# Patient Record
Sex: FEMALE | Race: WHITE | NOT HISPANIC OR LATINO | Employment: FULL TIME | URBAN - METROPOLITAN AREA
[De-identification: names, ages, dates, MRNs, and addresses within clinical notes are randomized per-mention and may not be internally consistent; named-entity substitution may affect disease eponyms.]

---

## 2023-05-23 ENCOUNTER — LAB (OUTPATIENT)
Dept: LAB | Facility: HOSPITAL | Age: 53
End: 2023-05-23
Payer: MEDICAID

## 2023-05-23 ENCOUNTER — OFFICE VISIT (OUTPATIENT)
Dept: FAMILY MEDICINE CLINIC | Facility: CLINIC | Age: 53
End: 2023-05-23

## 2023-05-23 VITALS
TEMPERATURE: 97.8 F | HEIGHT: 64 IN | WEIGHT: 165.6 LBS | DIASTOLIC BLOOD PRESSURE: 68 MMHG | OXYGEN SATURATION: 97 % | HEART RATE: 72 BPM | BODY MASS INDEX: 28.27 KG/M2 | SYSTOLIC BLOOD PRESSURE: 116 MMHG

## 2023-05-23 DIAGNOSIS — Z01.419 ROUTINE GYNECOLOGICAL EXAMINATION: ICD-10-CM

## 2023-05-23 DIAGNOSIS — Z23 IMMUNIZATION DUE: ICD-10-CM

## 2023-05-23 DIAGNOSIS — Z00.00 ENCOUNTER FOR PREVENTATIVE ADULT HEALTH CARE EXAMINATION: Primary | ICD-10-CM

## 2023-05-23 PROBLEM — N92.0 MENORRHAGIA: Status: ACTIVE | Noted: 2017-03-29

## 2023-05-23 PROBLEM — H52.4 BILATERAL PRESBYOPIA: Status: ACTIVE | Noted: 2017-01-18

## 2023-05-23 PROBLEM — R55 PRE-SYNCOPE: Status: ACTIVE | Noted: 2023-05-23

## 2023-05-23 PROBLEM — R09.89 ABNORMAL CAROTID PULSE: Status: ACTIVE | Noted: 2023-05-23

## 2023-05-23 PROBLEM — E55.9 VITAMIN D DEFICIENCY, UNSPECIFIED: Status: ACTIVE | Noted: 2017-05-04

## 2023-05-23 PROBLEM — S01.81XA SUPERFICIAL LACERATION OF FACE: Status: ACTIVE | Noted: 2023-05-23

## 2023-05-23 PROBLEM — N39.0 URINARY TRACT INFECTION: Status: ACTIVE | Noted: 2023-05-23

## 2023-05-23 PROBLEM — H52.223 REGULAR ASTIGMATISM OF BOTH EYES: Status: ACTIVE | Noted: 2023-05-17

## 2023-05-23 PROBLEM — J45.909 ASTHMATIC BRONCHITIS: Status: ACTIVE | Noted: 2023-05-23

## 2023-05-23 LAB
ALBUMIN SERPL-MCNC: 4.3 G/DL (ref 3.5–5.2)
ALBUMIN/GLOB SERPL: 1.7 G/DL
ALP SERPL-CCNC: 95 U/L (ref 39–117)
ALT SERPL W P-5'-P-CCNC: 10 U/L (ref 1–33)
ANION GAP SERPL CALCULATED.3IONS-SCNC: 13 MMOL/L (ref 5–15)
AST SERPL-CCNC: 18 U/L (ref 1–32)
BILIRUB SERPL-MCNC: 0.4 MG/DL (ref 0–1.2)
BUN SERPL-MCNC: 8 MG/DL (ref 6–20)
BUN/CREAT SERPL: 8.4 (ref 7–25)
CALCIUM SPEC-SCNC: 9.6 MG/DL (ref 8.6–10.5)
CHLORIDE SERPL-SCNC: 103 MMOL/L (ref 98–107)
CHOLEST SERPL-MCNC: 173 MG/DL (ref 0–200)
CO2 SERPL-SCNC: 25 MMOL/L (ref 22–29)
CREAT SERPL-MCNC: 0.95 MG/DL (ref 0.57–1)
DEPRECATED RDW RBC AUTO: 42.1 FL (ref 37–54)
EGFRCR SERPLBLD CKD-EPI 2021: 71.8 ML/MIN/1.73
ERYTHROCYTE [DISTWIDTH] IN BLOOD BY AUTOMATED COUNT: 12.7 % (ref 12.3–15.4)
GLOBULIN UR ELPH-MCNC: 2.5 GM/DL
GLUCOSE SERPL-MCNC: 80 MG/DL (ref 65–99)
HCT VFR BLD AUTO: 37.1 % (ref 34–46.6)
HDLC SERPL-MCNC: 42 MG/DL (ref 40–60)
HGB BLD-MCNC: 12.7 G/DL (ref 12–15.9)
LDLC SERPL CALC-MCNC: 120 MG/DL (ref 0–100)
LDLC/HDLC SERPL: 2.84 {RATIO}
MCH RBC QN AUTO: 30.9 PG (ref 26.6–33)
MCHC RBC AUTO-ENTMCNC: 34.2 G/DL (ref 31.5–35.7)
MCV RBC AUTO: 90.3 FL (ref 79–97)
PLATELET # BLD AUTO: 218 10*3/MM3 (ref 140–450)
PMV BLD AUTO: 9.4 FL (ref 6–12)
POTASSIUM SERPL-SCNC: 3.8 MMOL/L (ref 3.5–5.2)
PROT SERPL-MCNC: 6.8 G/DL (ref 6–8.5)
RBC # BLD AUTO: 4.11 10*6/MM3 (ref 3.77–5.28)
SODIUM SERPL-SCNC: 141 MMOL/L (ref 136–145)
T4 FREE SERPL-MCNC: 1.07 NG/DL (ref 0.93–1.7)
TRIGL SERPL-MCNC: 59 MG/DL (ref 0–150)
TSH SERPL DL<=0.05 MIU/L-ACNC: 1.67 UIU/ML (ref 0.27–4.2)
VLDLC SERPL-MCNC: 11 MG/DL (ref 5–40)
WBC NRBC COR # BLD: 10.73 10*3/MM3 (ref 3.4–10.8)

## 2023-05-23 PROCEDURE — 80061 LIPID PANEL: CPT | Performed by: PHYSICIAN ASSISTANT

## 2023-05-23 PROCEDURE — 36415 COLL VENOUS BLD VENIPUNCTURE: CPT | Performed by: PHYSICIAN ASSISTANT

## 2023-05-23 PROCEDURE — 84439 ASSAY OF FREE THYROXINE: CPT | Performed by: PHYSICIAN ASSISTANT

## 2023-05-23 PROCEDURE — 84443 ASSAY THYROID STIM HORMONE: CPT | Performed by: PHYSICIAN ASSISTANT

## 2023-05-23 PROCEDURE — 85027 COMPLETE CBC AUTOMATED: CPT | Performed by: PHYSICIAN ASSISTANT

## 2023-05-23 PROCEDURE — 80053 COMPREHEN METABOLIC PANEL: CPT | Performed by: PHYSICIAN ASSISTANT

## 2023-05-23 RX ORDER — DOXYCYCLINE HYCLATE 100 MG
100 TABLET ORAL 2 TIMES DAILY
COMMUNITY

## 2023-05-23 RX ORDER — FLUTICASONE PROPIONATE 50 MCG
1 SPRAY, SUSPENSION (ML) NASAL DAILY
COMMUNITY
Start: 2022-10-21 | End: 2023-10-21

## 2023-05-23 RX ORDER — FLUTICASONE PROPIONATE 110 UG/1
AEROSOL, METERED RESPIRATORY (INHALATION) 2 TIMES DAILY
COMMUNITY
Start: 2015-10-19 | End: 2023-05-23

## 2023-05-23 RX ORDER — LEVONORGESTREL 52 MG/1
1 INTRAUTERINE DEVICE INTRAUTERINE
COMMUNITY
End: 2023-05-23

## 2023-05-23 RX ORDER — FLUTICASONE FUROATE AND VILANTEROL 100; 25 UG/1; UG/1
POWDER RESPIRATORY (INHALATION) DAILY
COMMUNITY
Start: 2015-09-09 | End: 2023-05-23

## 2023-05-23 NOTE — PROGRESS NOTES
New Patient Office Visit      Date: 2023   Patient Name: Eleanor Pettit  : 1970   MRN: [id number]     Chief Complaint:    Chief Complaint   Patient presents with   • Contraception     Pt has had the  Mirena approx 8 yrs and would like to discuss removal   • Hand Pain     Both middle fingers are painful   • Migraine     Pt is having 3 migraines per wk.  Seeing a chiropractor who is working on her spine and thinks that once that has resolved so will the headaches       History of Present Illness: Eleanor Pettit is a 52 y.o. female who is here today to establish care.  She has the Mirena IUD and has had this approximately 8 years and would like it to be removed.  She needs referral to GYN.  She also has pain in both middle fingers.  She denies any unusual activities or injuries to the fingers.  She states that she has about 3 migraines a week but is seeing a chiropractor and states that he feels that once he has helped with her neck the headache should resolve.    Subjective      Review of Systems:   Review of Systems     Past Medical History: History reviewed. No pertinent past medical history.    Past Surgical History: History reviewed. No pertinent surgical history.    Family History:   Family History   Problem Relation Age of Onset   • Cancer Mother    • Cancer Father    • Cancer Maternal Aunt    • Cancer Maternal Uncle    • Cancer Maternal Grandmother        Social History:   Social History     Socioeconomic History   • Marital status: Single   Tobacco Use   • Smoking status: Never     Passive exposure: Never   • Smokeless tobacco: Never   Vaping Use   • Vaping Use: Never used   Substance and Sexual Activity   • Alcohol use: Yes     Alcohol/week: 2.0 standard drinks     Types: 2 Glasses of wine per week   • Drug use: Never   • Sexual activity: Defer       Medications:     Current Outpatient Medications:   •  doxycycline (VIBRAMYICN) 100 MG tablet, Take 1 tablet by mouth 2 (Two) Times a Day., Disp:  ", Rfl:   •  fluticasone (FLONASE) 50 MCG/ACT nasal spray, 1 spray into the nostril(s) as directed by provider Daily., Disp: , Rfl:     Allergies:   No Known Allergies    Objective     Vital Signs:   Vitals:    05/23/23 1312   BP: 116/68   Pulse: 72   Temp: 97.8 °F (36.6 °C)   TempSrc: Infrared   SpO2: 97%   Weight: 75.1 kg (165 lb 9.6 oz)   Height: 161.3 cm (63.5\")   PainSc: 0-No pain     Body mass index is 28.87 kg/m².   BMI is >= 25 and <30. (Overweight) The following options were offered after discussion;: weight loss educational material (shared in after visit summary)      Physical Exam:   Physical Exam  Vitals and nursing note reviewed.   Constitutional:       General: She is not in acute distress.     Appearance: Normal appearance. She is well-developed.   HENT:      Head: Normocephalic and atraumatic.      Right Ear: Tympanic membrane and ear canal normal. There is no impacted cerumen.      Left Ear: Tympanic membrane and ear canal normal. There is no impacted cerumen.      Nose: Nose normal. No congestion or rhinorrhea.      Mouth/Throat:      Mouth: Mucous membranes are moist.      Pharynx: Oropharynx is clear. No oropharyngeal exudate or posterior oropharyngeal erythema.   Eyes:      General: No scleral icterus.        Right eye: No discharge.         Left eye: No discharge.      Extraocular Movements: Extraocular movements intact.      Conjunctiva/sclera: Conjunctivae normal.      Pupils: Pupils are equal, round, and reactive to light.   Neck:      Thyroid: No thyromegaly.      Vascular: No carotid bruit.   Cardiovascular:      Rate and Rhythm: Normal rate and regular rhythm.      Heart sounds: Normal heart sounds. No murmur heard.  Pulmonary:      Breath sounds: Normal breath sounds. No wheezing, rhonchi or rales.   Abdominal:      General: Bowel sounds are normal. There is no distension.      Palpations: Abdomen is soft. There is no mass.      Tenderness: There is no abdominal tenderness. "   Musculoskeletal:         General: No swelling. Normal range of motion.      Right hand: Tenderness (Tenderness third digit PIP) present.      Left hand: Tenderness (Tenderness third digit PIP) present.      Cervical back: Normal range of motion and neck supple.      Right lower leg: No edema.      Left lower leg: No edema.   Lymphadenopathy:      Cervical: No cervical adenopathy.   Skin:     General: Skin is warm.      Coloration: Skin is not jaundiced or pale.      Findings: No bruising or rash.   Neurological:      General: No focal deficit present.      Mental Status: She is alert.      Cranial Nerves: No cranial nerve deficit.      Motor: No weakness.      Gait: Gait normal.   Psychiatric:         Mood and Affect: Mood normal.         Behavior: Behavior normal.         Judgment: Judgment normal.          Procedures     Assessment / Plan      Assessment/Plan:   Diagnoses and all orders for this visit:    1. Encounter for preventative adult health care examination (Primary)  -     Comprehensive metabolic panel; Future  -     CBC No Differential; Future  -     T4, free; Future  -     TSH; Future  -     Lipid panel; Future  -     Comprehensive metabolic panel  -     CBC No Differential  -     T4, free  -     TSH  -     Lipid panel    2. Immunization due  -     Shingrix Vaccine  -     Pneumococcal Conjugate Vaccine 20-Valent (PCV20)    3. Routine gynecological examination  -     Ambulatory Referral to Gynecology         1. Will refer to gynecology for Mirena removal labs today.  Update vaccines today.      Follow Up:   No follow-ups on file.    Valery Yi PA-C   [facility] [place]

## 2023-08-02 ENCOUNTER — OFFICE VISIT (OUTPATIENT)
Dept: OBSTETRICS AND GYNECOLOGY | Facility: CLINIC | Age: 53
End: 2023-08-02
Payer: MEDICAID

## 2023-08-02 ENCOUNTER — LAB (OUTPATIENT)
Dept: LAB | Facility: HOSPITAL | Age: 53
End: 2023-08-02
Payer: MEDICAID

## 2023-08-02 VITALS
HEIGHT: 63 IN | BODY MASS INDEX: 29.95 KG/M2 | DIASTOLIC BLOOD PRESSURE: 60 MMHG | WEIGHT: 169 LBS | SYSTOLIC BLOOD PRESSURE: 110 MMHG

## 2023-08-02 DIAGNOSIS — Z97.5 IUD (INTRAUTERINE DEVICE) IN PLACE: ICD-10-CM

## 2023-08-02 DIAGNOSIS — L23.7 POISON IVY DERMATITIS: ICD-10-CM

## 2023-08-02 DIAGNOSIS — N91.2 AMENORRHEA: ICD-10-CM

## 2023-08-02 DIAGNOSIS — Z01.419 WELL WOMAN EXAM WITH ROUTINE GYNECOLOGICAL EXAM: Primary | ICD-10-CM

## 2023-08-02 DIAGNOSIS — N63.11 MASS OF UPPER OUTER QUADRANT OF RIGHT BREAST: ICD-10-CM

## 2023-08-02 LAB
FSH SERPL-ACNC: 53.5 MIU/ML
LH SERPL-ACNC: 45.3 MIU/ML

## 2023-08-02 PROCEDURE — 83002 ASSAY OF GONADOTROPIN (LH): CPT

## 2023-08-02 PROCEDURE — 36415 COLL VENOUS BLD VENIPUNCTURE: CPT

## 2023-08-02 PROCEDURE — 83001 ASSAY OF GONADOTROPIN (FSH): CPT

## 2023-08-02 RX ORDER — TRIAMCINOLONE ACETONIDE 5 MG/G
1 CREAM TOPICAL 2 TIMES DAILY
Qty: 15 G | Refills: 0 | Status: SHIPPED | OUTPATIENT
Start: 2023-08-02

## 2023-08-03 LAB — REF LAB TEST METHOD: NORMAL

## 2024-01-12 ENCOUNTER — OFFICE VISIT (OUTPATIENT)
Dept: FAMILY MEDICINE CLINIC | Facility: CLINIC | Age: 54
End: 2024-01-12
Payer: COMMERCIAL

## 2024-01-12 VITALS
DIASTOLIC BLOOD PRESSURE: 68 MMHG | WEIGHT: 178 LBS | OXYGEN SATURATION: 98 % | HEART RATE: 72 BPM | BODY MASS INDEX: 31.54 KG/M2 | SYSTOLIC BLOOD PRESSURE: 120 MMHG | HEIGHT: 63 IN | TEMPERATURE: 98.2 F

## 2024-01-12 DIAGNOSIS — Z23 NEED FOR IMMUNIZATION AGAINST INFLUENZA: ICD-10-CM

## 2024-01-12 DIAGNOSIS — F32.1 CURRENT MODERATE EPISODE OF MAJOR DEPRESSIVE DISORDER WITHOUT PRIOR EPISODE: Primary | ICD-10-CM
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
## 2024-02-09 ENCOUNTER — OFFICE VISIT (OUTPATIENT)
Dept: FAMILY MEDICINE CLINIC | Facility: CLINIC | Age: 54
End: 2024-02-09
Payer: COMMERCIAL

## 2024-02-09 VITALS
HEART RATE: 58 BPM | OXYGEN SATURATION: 98 % | DIASTOLIC BLOOD PRESSURE: 72 MMHG | BODY MASS INDEX: 30.55 KG/M2 | TEMPERATURE: 98.6 F | WEIGHT: 172.4 LBS | SYSTOLIC BLOOD PRESSURE: 104 MMHG | HEIGHT: 63 IN

## 2024-02-09 DIAGNOSIS — F32.1 CURRENT MODERATE EPISODE OF MAJOR DEPRESSIVE DISORDER WITHOUT PRIOR EPISODE: ICD-10-CM

## 2024-02-09 PROCEDURE — 99213 OFFICE O/P EST LOW 20 MIN: CPT | Performed by: PHYSICIAN ASSISTANT

## 2025-02-11 ENCOUNTER — LAB (OUTPATIENT)
Dept: LAB | Facility: HOSPITAL | Age: 55
End: 2025-02-11
Payer: COMMERCIAL

## 2025-02-11 ENCOUNTER — OFFICE VISIT (OUTPATIENT)
Dept: FAMILY MEDICINE CLINIC | Facility: CLINIC | Age: 55
End: 2025-02-11
Payer: COMMERCIAL

## 2025-02-11 VITALS
DIASTOLIC BLOOD PRESSURE: 52 MMHG | BODY MASS INDEX: 25.16 KG/M2 | HEART RATE: 65 BPM | OXYGEN SATURATION: 99 % | SYSTOLIC BLOOD PRESSURE: 100 MMHG | TEMPERATURE: 97.9 F | HEIGHT: 63 IN | WEIGHT: 142 LBS

## 2025-02-11 DIAGNOSIS — Z00.00 ANNUAL PHYSICAL EXAM: Primary | ICD-10-CM

## 2025-02-11 DIAGNOSIS — F32.1 CURRENT MODERATE EPISODE OF MAJOR DEPRESSIVE DISORDER WITHOUT PRIOR EPISODE: ICD-10-CM

## 2025-02-11 DIAGNOSIS — Z23 FLU VACCINE NEED: ICD-10-CM

## 2025-02-11 LAB
DEPRECATED RDW RBC AUTO: 41.5 FL (ref 37–54)
ERYTHROCYTE [DISTWIDTH] IN BLOOD BY AUTOMATED COUNT: 13 % (ref 12.3–15.4)
HBA1C MFR BLD: 4.8 % (ref 4.8–5.6)
HCT VFR BLD AUTO: 37.3 % (ref 34–46.6)
HGB BLD-MCNC: 12.5 G/DL (ref 12–15.9)
MCH RBC QN AUTO: 29.6 PG (ref 26.6–33)
MCHC RBC AUTO-ENTMCNC: 33.5 G/DL (ref 31.5–35.7)
MCV RBC AUTO: 88.4 FL (ref 79–97)
PLATELET # BLD AUTO: 214 10*3/MM3 (ref 140–450)
PMV BLD AUTO: 9.4 FL (ref 6–12)
RBC # BLD AUTO: 4.22 10*6/MM3 (ref 3.77–5.28)
WBC NRBC COR # BLD AUTO: 7.97 10*3/MM3 (ref 3.4–10.8)

## 2025-02-11 PROCEDURE — 85027 COMPLETE CBC AUTOMATED: CPT | Performed by: PHYSICIAN ASSISTANT

## 2025-02-11 PROCEDURE — 82607 VITAMIN B-12: CPT | Performed by: PHYSICIAN ASSISTANT

## 2025-02-11 PROCEDURE — 80061 LIPID PANEL: CPT | Performed by: PHYSICIAN ASSISTANT

## 2025-02-11 PROCEDURE — 80053 COMPREHEN METABOLIC PANEL: CPT | Performed by: PHYSICIAN ASSISTANT

## 2025-02-11 PROCEDURE — 36415 COLL VENOUS BLD VENIPUNCTURE: CPT | Performed by: PHYSICIAN ASSISTANT

## 2025-02-11 PROCEDURE — 84443 ASSAY THYROID STIM HORMONE: CPT | Performed by: PHYSICIAN ASSISTANT

## 2025-02-11 PROCEDURE — 84439 ASSAY OF FREE THYROXINE: CPT | Performed by: PHYSICIAN ASSISTANT

## 2025-02-11 PROCEDURE — 83036 HEMOGLOBIN GLYCOSYLATED A1C: CPT | Performed by: PHYSICIAN ASSISTANT

## 2025-02-11 NOTE — PROGRESS NOTES
"     Female Physical Note      Date: 2025   Patient Name: Eleanor Pettit  : 1970   MRN: [id number]     Chief Complaint:    Chief Complaint   Patient presents with    Annual Exam    Hearing Problem     Right ear only       History of Present Illness: Eleanor Pettit is a 54 y.o. female who is here today for their annual health maintenance and physical.   History of Present Illness  The patient presents for a checkup.    She is uncertain about the completion of her shingles vaccination series, having received only the initial dose. She has not yet received her influenza vaccine for the current year. Her last colonoscopy was performed in , and she acknowledges the need for a subsequent procedure. Given her family history of colon cancer, she undergoes colonoscopies every 5 years. She maintains a good appetite but experiences intermittent constipation. She has a history of skin cancer on her back, which was previously excised. She has been experiencing pruritus on the upper half of her back, particularly in the area above her bra strap. Occasionally, she notices blood on her nightshirt, but upon inspection by her son, no visible lesions are observed.    She reports a decrease in auditory acuity in one ear, which she attributes to potential cerumen impaction. She has not experienced any associated otalgia.    She has been responding positively to sertraline, which she refers to as her \"happy pill.\" She expresses satisfaction with the current dosage and does not wish to make any adjustments. She believes that consistent daily intake would further enhance its efficacy.    She experiences knee pain during sleep, a symptom she has not previously encountered. She typically flexes her knee when retiring to bed, but this position has become uncomfortable.    She has annual eye exams and was told she would rapidly lose her eyesight at 39 and it would level out at 57. She can not see without glasses and they can " never get her contact prescription correct, so she just wears glasses. S    FAMILY HISTORY  Her mother  of colon cancer, although there was some uncertainty about whether it was stomach cancer that perforated the colon or breast cancer that metastasized. She also had two uncles and a cousin with colon cancer.    MEDICATIONS  Current: Sertraline  Discontinued: Doxycycline    IMMUNIZATIONS  She has received the first part of the shingles vaccine. She has not received the influenza vaccine this year.      Subjective      Review of Systems:   Review of Systems    Past Medical History, Social History, Family History and Care Team were all reviewed with patient and updated as appropriate.     Medications:     Current Outpatient Medications:     sertraline (ZOLOFT) 50 MG tablet, Take 1 tablet by mouth Daily., Disp: 90 tablet, Rfl: 3    Allergies:   No Known Allergies    PHQ-9 Depression Screening  Little interest or pleasure in doing things? Not at all   Feeling down, depressed, or hopeless? Not at all   PHQ-2 Total Score 0   Trouble falling or staying asleep, or sleeping too much?     Feeling tired or having little energy?     Poor appetite or overeating?     Feeling bad about yourself - or that you are a failure or have let yourself or your family down?     Trouble concentrating on things, such as reading the newspaper or watching television?     Moving or speaking so slowly that other people could have noticed? Or the opposite - being so fidgety or restless that you have been moving around a lot more than usual?     Thoughts that you would be better off dead, or of hurting yourself in some way?     PHQ-9 Total Score     If you checked off any problems, how difficult have these problems made it for you to do your work, take care of things at home, or get along with other people?           Objective     Vital Signs:   Vitals:    25 1346   BP: 100/52   Pulse: 65   Temp: 97.9 °F (36.6 °C)   TempSrc: Infrared  "  SpO2: 99%   Weight: 64.4 kg (142 lb)   Height: 160 cm (62.99\")   PainSc: 0-No pain     Body mass index is 25.16 kg/m².   BMI is >= 25 and <30. (Overweight) The following options were offered after discussion;: weight loss educational material (shared in after visit summary)      Physical Exam:   Physical Exam  Vitals and nursing note reviewed.   Constitutional:       General: She is not in acute distress.     Appearance: Normal appearance. She is well-developed.   HENT:      Head: Normocephalic and atraumatic.      Right Ear: Tympanic membrane and ear canal normal. There is no impacted cerumen.      Left Ear: Tympanic membrane and ear canal normal. There is no impacted cerumen.      Nose: Nose normal. No congestion or rhinorrhea.      Mouth/Throat:      Mouth: Mucous membranes are moist.      Pharynx: Oropharynx is clear. No oropharyngeal exudate or posterior oropharyngeal erythema.   Eyes:      General: No scleral icterus.        Right eye: No discharge.         Left eye: No discharge.      Extraocular Movements: Extraocular movements intact.      Conjunctiva/sclera: Conjunctivae normal.      Pupils: Pupils are equal, round, and reactive to light.   Neck:      Thyroid: No thyromegaly.      Vascular: No carotid bruit.   Cardiovascular:      Rate and Rhythm: Normal rate and regular rhythm.      Heart sounds: Normal heart sounds. No murmur heard.  Pulmonary:      Breath sounds: Normal breath sounds. No wheezing, rhonchi or rales.   Abdominal:      General: Bowel sounds are normal. There is no distension.      Palpations: Abdomen is soft. There is no mass.      Tenderness: There is no abdominal tenderness.   Musculoskeletal:         General: No swelling. Normal range of motion.      Cervical back: Normal range of motion and neck supple.      Right lower leg: No edema.      Left lower leg: No edema.   Lymphadenopathy:      Cervical: No cervical adenopathy.   Skin:     General: Skin is warm.      Coloration: Skin is " not jaundiced or pale.      Findings: No bruising, lesion or rash.   Neurological:      General: No focal deficit present.      Mental Status: She is alert.      Cranial Nerves: No cranial nerve deficit.      Motor: No weakness.      Gait: Gait normal.   Psychiatric:         Mood and Affect: Mood normal.         Behavior: Behavior normal.         Judgment: Judgment normal.          Procedures    Assessment / Plan      Assessment/Plan:   Diagnoses and all orders for this visit:    1. Annual physical exam (Primary)  -     Comprehensive metabolic panel; Future  -     Lipid panel; Future  -     T4, free; Future  -     TSH; Future  -     CBC No Differential; Future  -     Vitamin B12; Future  -     Hemoglobin A1c; Future    2. Current moderate episode of major depressive disorder without prior episode  -     sertraline (ZOLOFT) 50 MG tablet; Take 1 tablet by mouth Daily.  Dispense: 90 tablet; Refill: 3    3. Flu vaccine need  -     Fluzone >6mos ([id number])    4. BMI 25.0-25.9,adult       Assessment & Plan  1. Health maintenance.  Her blood pressure readings are within the normal range. She will receive her second shingles vaccine today. She will also receive her influenza vaccine today. A colonoscopy is scheduled for next year. Laboratory tests will be conducted today.    2. Hearing loss.  There is no evidence of cerumen impaction or fluid accumulation in the ear. Recommend hearing test.    3. Depression.  A refill of her sertraline prescription will be provided.    4. Knee pain.  The pain could potentially be attributed to arthritis. She is advised to use a pillow for support while sleeping on her side to aid in alignment and provide cushioning for the joints.        PROCEDURE  Colonoscopy was performed in 2021.  Excision of skin cancer on the back was performed previously.    Follow Up:   Return in about 1 year (around 2/11/2026) for Annual.    Healthcare Maintenance:   Counseling provided on colonoscopy  recommendations. Vaccines - flu and shingles. .   Eleanor Pettit voices understanding and acceptance of this advice and will call back with any further questions or concerns. AVS with preventive healthcare tips printed for patient.     Patient or patient representative verbalized consent for the use of Ambient Listening during the visit with  Valery Yi PA-C for chart documentation. 2/11/2025  14:20 NUBIA Yi PA-C   [facility] [place] [facility] [place]

## 2025-02-12 LAB
ALBUMIN SERPL-MCNC: 4.2 G/DL (ref 3.5–5.2)
ALBUMIN/GLOB SERPL: 1.6 G/DL
ALP SERPL-CCNC: 83 U/L (ref 39–117)
ALT SERPL W P-5'-P-CCNC: 13 U/L (ref 1–33)
ANION GAP SERPL CALCULATED.3IONS-SCNC: 8 MMOL/L (ref 5–15)
AST SERPL-CCNC: 18 U/L (ref 1–32)
BILIRUB SERPL-MCNC: 0.3 MG/DL (ref 0–1.2)
BUN SERPL-MCNC: 10 MG/DL (ref 6–20)
BUN/CREAT SERPL: 13.9 (ref 7–25)
CALCIUM SPEC-SCNC: 9.4 MG/DL (ref 8.6–10.5)
CHLORIDE SERPL-SCNC: 104 MMOL/L (ref 98–107)
CHOLEST SERPL-MCNC: 166 MG/DL (ref 0–200)
CO2 SERPL-SCNC: 26 MMOL/L (ref 22–29)
CREAT SERPL-MCNC: 0.72 MG/DL (ref 0.57–1)
EGFRCR SERPLBLD CKD-EPI 2021: 98.9 ML/MIN/1.73
GLOBULIN UR ELPH-MCNC: 2.7 GM/DL
GLUCOSE SERPL-MCNC: 79 MG/DL (ref 65–99)
HDLC SERPL-MCNC: 42 MG/DL (ref 40–60)
LDLC SERPL CALC-MCNC: 106 MG/DL (ref 0–100)
LDLC/HDLC SERPL: 2.5 {RATIO}
POTASSIUM SERPL-SCNC: 4.1 MMOL/L (ref 3.5–5.2)
PROT SERPL-MCNC: 6.9 G/DL (ref 6–8.5)
SODIUM SERPL-SCNC: 138 MMOL/L (ref 136–145)
T4 FREE SERPL-MCNC: 1.38 NG/DL (ref 0.92–1.68)
TRIGL SERPL-MCNC: 95 MG/DL (ref 0–150)
TSH SERPL DL<=0.05 MIU/L-ACNC: 1.18 UIU/ML (ref 0.27–4.2)
VIT B12 BLD-MCNC: 1065 PG/ML (ref 211–946)
VLDLC SERPL-MCNC: 18 MG/DL (ref 5–40)

## 2025-06-08 ENCOUNTER — HOSPITAL ENCOUNTER (EMERGENCY)
Facility: HOSPITAL | Age: 55
Discharge: HOME | End: 2025-06-08
Attending: EMERGENCY MEDICINE | Admitting: EMERGENCY MEDICINE
Payer: COMMERCIAL

## 2025-06-08 ENCOUNTER — APPOINTMENT (OUTPATIENT)
Facility: HOSPITAL | Age: 55
End: 2025-06-08
Payer: COMMERCIAL

## 2025-06-08 VITALS
SYSTOLIC BLOOD PRESSURE: 122 MMHG | RESPIRATION RATE: 18 BRPM | HEART RATE: 81 BPM | HEIGHT: 63 IN | BODY MASS INDEX: 25.18 KG/M2 | TEMPERATURE: 98.9 F | DIASTOLIC BLOOD PRESSURE: 74 MMHG | OXYGEN SATURATION: 100 % | WEIGHT: 142.1 LBS

## 2025-06-08 DIAGNOSIS — S06.0X0A CONCUSSION WITHOUT LOSS OF CONSCIOUSNESS, INITIAL ENCOUNTER: Primary | ICD-10-CM

## 2025-06-08 PROCEDURE — 99284 EMERGENCY DEPT VISIT MOD MDM: CPT | Performed by: EMERGENCY MEDICINE

## 2025-06-08 PROCEDURE — 70450 CT HEAD/BRAIN W/O DYE: CPT

## 2025-06-08 NOTE — [facility] PROVIDER NOTE
Subjective  History of Present Illness:    Patient is a 54-year-old female presents emergency department for evaluation of headache, lightheadedness, trouble concentrating for the past 3 days.  Patient states on Friday she was opening a gate when she tripped over something walking backwards and fell backwards hitting the back of her head hard against a metal pole.  Patient denies loss of consciousness, nausea or vomiting.  Patient states she has had vague symptoms of lightheadedness, trouble concentrating, photosensitivity and sensitivity to sounds ever since hitting her head and her friend who is a physician told her to go to the emergency department for evaluation.  Patient has had 1 concussion before in her 20s.  Patient denies focal deficits.  Patient denies sudden thunderclap onset of headache.  Patient has not taken Tylenol or ibuprofen for headaches.      Nurses Notes reviewed and agree, including vitals, allergies, social history and prior medical history.     REVIEW OF SYSTEMS: All systems reviewed and not pertinent unless noted.  Review of Systems   Neurological:  Positive for light-headedness and headaches.   All other systems reviewed and are negative.      No past medical history on file.    Allergies:    Patient has no known allergies.      Past Surgical History:   Procedure Laterality Date    APPENDECTOMY  1980    CERVICAL CONIZATION  2007         Social History     Socioeconomic History    Marital status: Single    Number of children: 0    Highest education level: Master's degree (e.g., MA, MS, Ying, MEd, MSW, [employer])   Tobacco Use    Smoking status: Never     Passive exposure: Never    Smokeless tobacco: Never   Vaping Use    Vaping status: Never Used   Substance and Sexual Activity    Alcohol use: Yes     Alcohol/week: 10.0 standard drinks of alcohol     Types: 5 Glasses of wine, 5 Standard drinks or equivalent per week     Comment: 3-5 weekly    Drug use: Never    Sexual activity: Yes     Partners:  "Male     Birth control/protection: I.U.D.         Family History   Problem Relation Age of Onset    Cancer Father     Breast cancer Mother     Colon cancer Mother     Cancer Mother     Cancer Maternal Grandmother     Cancer Maternal Aunt     Cancer Maternal Uncle        Objective  Physical Exam:  /75   Pulse 73   Temp 98.9 °F (37.2 °C) (Oral)   Resp 18   Ht 160 cm (62.99\")   Wt 64.5 kg (142 lb 1.6 oz)   LMP  (LMP Unknown)   SpO2 99%   BMI 25.18 kg/m²      Physical Exam  Vitals and nursing note reviewed.   Constitutional:       General: She is not in acute distress.     Appearance: Normal appearance. She is not toxic-appearing.   HENT:      Head: Normocephalic and atraumatic.      Right Ear: Tympanic membrane, ear canal and external ear normal.      Left Ear: Tympanic membrane, ear canal and external ear normal.      Nose: Nose normal.      Mouth/Throat:      Mouth: Mucous membranes are moist.   Eyes:      Extraocular Movements: Extraocular movements intact.      Conjunctiva/sclera: Conjunctivae normal.      Pupils: Pupils are equal, round, and reactive to light.   Cardiovascular:      Rate and Rhythm: Normal rate and regular rhythm.      Pulses: Normal pulses.      Heart sounds: Normal heart sounds. No murmur heard.  Pulmonary:      Effort: Pulmonary effort is normal. No respiratory distress.      Breath sounds: Normal breath sounds. No stridor. No wheezing.   Abdominal:      General: Abdomen is flat. There is no distension.      Palpations: Abdomen is soft.   Musculoskeletal:         General: No deformity. Normal range of motion.      Cervical back: Normal range of motion and neck supple.      Right lower leg: No edema.      Left lower leg: No edema.   Skin:     General: Skin is warm and dry.      Capillary Refill: Capillary refill takes less than 2 seconds.      Findings: No rash.   Neurological:      General: No focal deficit present.      Mental Status: She is alert and oriented to person, place, " and time.      Cranial Nerves: No cranial nerve deficit.      Gait: Gait normal.   Psychiatric:         Mood and Affect: Mood normal.         Behavior: Behavior normal.         Procedures    ED Course:         Lab Results (last 24 hours)       ** No results found for the last 24 hours. **             CT Head Without Contrast  Result Date: 6/8/2025  CT HEAD WO CONTRAST Date of Exam: 6/8/2025 6:13 PM EDT Indication: fall hit back of head, headache, nausea. Comparison: None available. Technique: Axial CT images were obtained of the head without contrast administration.  Automated exposure control and iterative construction methods were used. Findings: The ventricles are normal in size, position, and configuration. Sulci are not abnormally prominent. No abnormal gray or white matter density is appreciated. There is no CT evidence of acute intracranial hemorrhage, mass, or mass effect. The orbits have a normal appearance. No fractures are seen on bone window images. A small air-fluid level is seen in the left maxillary antrum. The paranasal sinuses are otherwise well aerated. The middle ears and mastoid air cells are well aerated.     Impression: Impression: CT scan of the head without IV contrast demonstrating no intracranial abnormality. Electronically Signed: Lele Ahmadi MD  6/8/2025 6:44 PM EDT  Workstation ID: [id number]         [facility]     Amount and/or Complexity of Data Reviewed  Tests in the radiology section of CPT®: reviewed        Initial impression of presenting illness: Patient is a 54-year-old female presents emergency department with lightheadedness, trouble concentrating, headaches ever since hitting her head on Friday when tripping backwards hitting her head on the back of a metal pole.    Patient arrives afebrile, hematin stable, nontoxic-appearing with vitals interpreted by myself.     Pertinent features from physical exam: Mild tenderness to occipital region of scalp.  No hemotympanum.    DDX: includes  but is not limited to: Concussion, intracranial hemorrhage, vertigo, others    Initial diagnostic plan and interventions: Workup includes CT head without contrast.      Results from initial plan were reviewed and interpreted by me revealing CT head shows no acute findings.    Re-evaluation: Physical exam consistent with likely concussion.  Patient is educated on supportive care for symptoms.  Patient educated on cognitive rest.  Patient instructed to follow with PCP for monitoring of symptoms and to return to daily activity.  Patient instructed not to do heavy lifting and to avoid any activities that could lead to head injury.  Patient to return precautions to the emergency department.      Medications - No data to display    Results/clinical rationale were discussed with patient      Data interpreted: Nursing notes reviewed, vital signs reviewed.  Labs independently interpreted by me.  Imaging independently interpreted by me.  EKG independently interpreted by me.     Counseling: Discussed the results above with the patient regarding need for admission or discharge.  Patient understands and agrees plan of care.      -----  ED Disposition       ED Disposition   Discharge    Condition   Stable    Comment   --             Final diagnoses:   Concussion without loss of consciousness, initial encounter      Your Follow-Up Providers       Schedule an appointment as soon as possible for a visit  with Valery Yi PA-C.    Specialties: Family Medicine, Physician Assistant  [address] [place], [place] [id number] [phone]                       Contact information for after-discharge care    Follow-up information has not been specified.                    Your medication list        CONTINUE taking these medications        Instructions Last Dose Given Next Dose Due   sertraline 50 MG tablet  Commonly known as: ZOLOFT      Take 1 tablet by mouth Daily.

## 2025-06-08 NOTE — DISCHARGE INSTRUCTIONS
Follow-up with PCP for recheck of symptoms.  Avoid all activities that could lead to head injury.  Utilize cognitive rest and avoid concentrating activities that worsen symptoms.  Follow-up with PCP for recheck of symptoms and clearance to return to normal activity.  Return the emergency department for new, worsening, concerning symptoms.